# Patient Record
Sex: FEMALE | Race: BLACK OR AFRICAN AMERICAN | NOT HISPANIC OR LATINO | Employment: OTHER | URBAN - METROPOLITAN AREA
[De-identification: names, ages, dates, MRNs, and addresses within clinical notes are randomized per-mention and may not be internally consistent; named-entity substitution may affect disease eponyms.]

---

## 2019-08-03 ENCOUNTER — PREP FOR SURGERY (OUTPATIENT)
Dept: OTHER | Facility: HOSPITAL | Age: 64
End: 2019-08-03

## 2019-08-03 DIAGNOSIS — Z80.0 FH: COLON CANCER: Primary | ICD-10-CM

## 2019-10-18 ENCOUNTER — TELEPHONE (OUTPATIENT)
Dept: GASTROENTEROLOGY | Facility: CLINIC | Age: 64
End: 2019-10-18

## 2019-10-18 NOTE — TELEPHONE ENCOUNTER
----- Message from Corinna Dawson Rep sent at 10/18/2019 11:34 AM EDT -----  Regarding: scope  Contact: [phone]  Pt is calling and wanting to talk to nurse said she is suppose to be scheduled for a colonoscopy when Fer is on call.

## 2019-10-18 NOTE — TELEPHONE ENCOUNTER
Call to pt.  States would like to schedule c/s with Dr Monroe - usually schedules with him on Hospitalist day.    Advise will send request to Scheduling.  Verb understanding.

## 2019-10-21 ENCOUNTER — PREP FOR SURGERY (OUTPATIENT)
Dept: OTHER | Facility: HOSPITAL | Age: 64
End: 2019-10-21

## 2019-10-21 DIAGNOSIS — Z80.0 FH: COLON CANCER: Primary | ICD-10-CM

## 2019-10-21 NOTE — TELEPHONE ENCOUNTER
"I think that she wants her colonoscopy scheduled on a Saturday that I am \"on call\". I see that I am \"on Call\" on Sat, 11/9/19. You could schedule it then. [email or web address]  "

## 2019-10-29 PROBLEM — Z80.0 FH: COLON CANCER: Status: ACTIVE | Noted: 2019-10-29

## 2020-08-09 ENCOUNTER — PREP FOR SURGERY (OUTPATIENT)
Dept: OTHER | Facility: HOSPITAL | Age: 65
End: 2020-08-09

## 2020-08-09 DIAGNOSIS — Z12.11 SCREEN FOR COLON CANCER: Primary | ICD-10-CM

## 2020-08-31 ENCOUNTER — TELEPHONE (OUTPATIENT)
Dept: GASTROENTEROLOGY | Facility: CLINIC | Age: 65
End: 2020-08-31

## 2020-08-31 NOTE — TELEPHONE ENCOUNTER
Left message for pt to call us back to schedule cn         ----- Message from Diane Bennett sent at 8/31/2020  9:31 AM EDT -----  Contact: [phone]  Patient is needing to schedule scope with JOSE

## 2020-09-01 PROBLEM — Z12.11 SCREEN FOR COLON CANCER: Status: ACTIVE | Noted: 2020-09-01

## 2020-10-05 ENCOUNTER — TRANSCRIBE ORDERS (OUTPATIENT)
Dept: ADMINISTRATIVE | Facility: HOSPITAL | Age: 65
End: 2020-10-05

## 2020-10-05 DIAGNOSIS — Z01.818 OTHER SPECIFIED PRE-OPERATIVE EXAMINATION: Primary | ICD-10-CM

## 2020-10-13 ENCOUNTER — TELEPHONE (OUTPATIENT)
Dept: GASTROENTEROLOGY | Facility: CLINIC | Age: 65
End: 2020-10-13

## 2020-10-13 NOTE — TELEPHONE ENCOUNTER
Called pt no answer lm on vm    ----- Message from Diane Bennett sent at 10/13/2020  8:07 AM EDT -----  Contact: [phone]  Patient has a procedure scheduled with Dr. Monroe on 10/14, did not have COVID test.  Please call patient to reschedule.  Thanks!

## 2020-10-14 ENCOUNTER — TELEPHONE (OUTPATIENT)
Dept: GASTROENTEROLOGY | Facility: CLINIC | Age: 65
End: 2020-10-14

## 2020-10-14 NOTE — TELEPHONE ENCOUNTER
Called pt back to confirm plan does not require auth for cpt 13892. No info on Covid testing being covered she would need to call [employer] member services.      Pt is calling insurance to confirm cpt 47286 and covid testing is covered then she will call back to reschedule

## 2020-10-15 ENCOUNTER — TRANSCRIBE ORDERS (OUTPATIENT)
Dept: SLEEP MEDICINE | Facility: HOSPITAL | Age: 65
End: 2020-10-15

## 2020-10-15 DIAGNOSIS — Z01.818 OTHER SPECIFIED PRE-OPERATIVE EXAMINATION: Primary | ICD-10-CM

## 2020-10-17 ENCOUNTER — LAB (OUTPATIENT)
Dept: LAB | Facility: HOSPITAL | Age: 65
End: 2020-10-17

## 2020-10-17 DIAGNOSIS — Z01.818 OTHER SPECIFIED PRE-OPERATIVE EXAMINATION: ICD-10-CM

## 2020-10-17 PROCEDURE — C9803 HOPD COVID-19 SPEC COLLECT: HCPCS

## 2020-10-17 PROCEDURE — U0004 COV-19 TEST NON-CDC HGH THRU: HCPCS | Performed by: INTERNAL MEDICINE

## 2020-10-19 LAB — SARS-COV-2 RNA RESP QL NAA+PROBE: NOT DETECTED

## 2020-10-20 ENCOUNTER — ANESTHESIA (OUTPATIENT)
Dept: GASTROENTEROLOGY | Facility: HOSPITAL | Age: 65
End: 2020-10-20

## 2020-10-20 ENCOUNTER — HOSPITAL ENCOUNTER (OUTPATIENT)
Facility: HOSPITAL | Age: 65
Setting detail: HOSPITAL OUTPATIENT SURGERY
Discharge: HOME | End: 2020-10-20
Attending: INTERNAL MEDICINE | Admitting: INTERNAL MEDICINE

## 2020-10-20 ENCOUNTER — ANESTHESIA EVENT (OUTPATIENT)
Dept: GASTROENTEROLOGY | Facility: HOSPITAL | Age: 65
End: 2020-10-20

## 2020-10-20 VITALS
WEIGHT: 270 LBS | TEMPERATURE: 98.3 F | OXYGEN SATURATION: 98 % | DIASTOLIC BLOOD PRESSURE: 51 MMHG | HEIGHT: 68 IN | BODY MASS INDEX: 40.92 KG/M2 | RESPIRATION RATE: 12 BRPM | HEART RATE: 58 BPM | SYSTOLIC BLOOD PRESSURE: 114 MMHG

## 2020-10-20 LAB — GLUCOSE BLDC GLUCOMTR-MCNC: 149 MG/DL (ref 70–130)

## 2020-10-20 PROCEDURE — G0121 COLON CA SCRN NOT HI RSK IND: HCPCS | Performed by: INTERNAL MEDICINE

## 2020-10-20 PROCEDURE — 25010000002 PROPOFOL 10 MG/ML EMULSION: Performed by: NURSE ANESTHETIST, CERTIFIED REGISTERED

## 2020-10-20 PROCEDURE — 82962 GLUCOSE BLOOD TEST: CPT

## 2020-10-20 RX ORDER — FLUTICASONE PROPIONATE 50 MCG
2 SPRAY, SUSPENSION (ML) NASAL AS NEEDED
COMMUNITY

## 2020-10-20 RX ORDER — SODIUM CHLORIDE 0.9 % (FLUSH) 0.9 %
10 SYRINGE (ML) INJECTION AS NEEDED
Status: DISCONTINUED | OUTPATIENT
Start: 2020-10-20 | End: 2020-10-20 | Stop reason: HOSPADM

## 2020-10-20 RX ORDER — LOSARTAN POTASSIUM AND HYDROCHLOROTHIAZIDE 25; 100 MG/1; MG/1
1 TABLET ORAL DAILY
COMMUNITY

## 2020-10-20 RX ORDER — EPHEDRINE SULFATE 50 MG/ML
INJECTION, SOLUTION INTRAVENOUS AS NEEDED
Status: DISCONTINUED | OUTPATIENT
Start: 2020-10-20 | End: 2020-10-20 | Stop reason: SURG

## 2020-10-20 RX ORDER — PROPOFOL 10 MG/ML
VIAL (ML) INTRAVENOUS CONTINUOUS PRN
Status: DISCONTINUED | OUTPATIENT
Start: 2020-10-20 | End: 2020-10-20 | Stop reason: SURG

## 2020-10-20 RX ORDER — GLYCOPYRROLATE 0.2 MG/ML
INJECTION INTRAMUSCULAR; INTRAVENOUS AS NEEDED
Status: DISCONTINUED | OUTPATIENT
Start: 2020-10-20 | End: 2020-10-20 | Stop reason: SURG

## 2020-10-20 RX ORDER — SODIUM CHLORIDE, SODIUM LACTATE, POTASSIUM CHLORIDE, CALCIUM CHLORIDE 600; 310; 30; 20 MG/100ML; MG/100ML; MG/100ML; MG/100ML
1000 INJECTION, SOLUTION INTRAVENOUS CONTINUOUS
Status: DISCONTINUED | OUTPATIENT
Start: 2020-10-20 | End: 2020-10-20 | Stop reason: HOSPADM

## 2020-10-20 RX ORDER — PROPOFOL 10 MG/ML
VIAL (ML) INTRAVENOUS AS NEEDED
Status: DISCONTINUED | OUTPATIENT
Start: 2020-10-20 | End: 2020-10-20 | Stop reason: SURG

## 2020-10-20 RX ADMIN — GLYCOPYRROLATE 0.2 MG: 0.2 INJECTION INTRAMUSCULAR; INTRAVENOUS at 08:55

## 2020-10-20 RX ADMIN — SODIUM CHLORIDE, POTASSIUM CHLORIDE, SODIUM LACTATE AND CALCIUM CHLORIDE: 600; 310; 30; 20 INJECTION, SOLUTION INTRAVENOUS at 08:28

## 2020-10-20 RX ADMIN — PROPOFOL 180 MCG/KG/MIN: 10 INJECTION, EMULSION INTRAVENOUS at 08:31

## 2020-10-20 RX ADMIN — PROPOFOL 50 MG: 10 INJECTION, EMULSION INTRAVENOUS at 08:31

## 2020-10-20 RX ADMIN — GLYCOPYRROLATE 0.2 MG: 0.2 INJECTION INTRAMUSCULAR; INTRAVENOUS at 08:38

## 2020-10-20 RX ADMIN — EPHEDRINE SULFATE 10 MG: 50 INJECTION INTRAVENOUS at 08:58

## 2020-10-20 NOTE — ANESTHESIA POSTPROCEDURE EVALUATION
"Patient: Mague Bennett    Procedure Summary     Date: 10/20/20 Room / Location:  [facility]    Anesthesia Start: 0827 Anesthesia Stop: 0902    Procedure: COLONOSCOPY INTO CECUM AND TERMINAL ILEUM (N/A ) Diagnosis:       Screen for colon cancer      (Screen for colon cancer [Z12.11])    Surgeon: Rudy Monroe MD Provider: Edwin Crockett MD    Anesthesia Type: MAC ASA Status: 3          Anesthesia Type: MAC    Vitals  Vitals Value Taken Time   /51 10/20/20 0924   Temp     Pulse 58 10/20/20 0924   Resp     SpO2 98 % 10/20/20 0924           Post Anesthesia Care and Evaluation    Patient location during evaluation: PACU  Patient participation: complete - patient participated  Level of consciousness: awake  Pain score: 0  Pain management: adequate  Airway patency: patent  Anesthetic complications: No anesthetic complications  PONV Status: none  Cardiovascular status: acceptable  Respiratory status: acceptable  Hydration status: acceptable    Comments: /51 (BP Location: Left arm, Patient Position: Lying)   Pulse 58   Temp 36.8 °C (98.3 °F)   Resp 12   Ht 172.7 cm (68\")   Wt 122 kg (270 lb)   SpO2 98%   BMI 41.05 kg/m²       "

## 2020-10-20 NOTE — H&P
"[facility], operated by [facility]  Pre Procedure History & Physical    Chief Complaint:   Time for my colonoscopy    Subjective     HPI:   65 y.o. female here for a screening colonoscopy. Her last colonoscopy was about 10 yrs ago.     Past Medical History:   Past Medical History:   Diagnosis Date   • Diabetes mellitus (CMS/HCC)    • Hyperlipidemia    • Hypertension    • Seasonal allergies        Family History:  History reviewed. No pertinent family history.    Social History:   reports that she has never smoked. She does not have any smokeless tobacco history on file. She reports that she does not drink alcohol.    Medications:   Medications Prior to Admission   Medication Sig Dispense Refill Last Dose   • fluticasone (FLONASE) 50 MCG/ACT nasal spray 2 sprays into the nostril(s) as directed by provider As Needed for Rhinitis.   Past Week at Unknown time   • insulin lispro (humaLOG) 100 UNIT/ML injection Inject  under the skin into the appropriate area as directed 3 (Three) Times a Day Before Meals. Per insulin pump   10/19/2020 at Unknown time   • losartan-hydrochlorothiazide (HYZAAR) 100-25 MG per tablet Take 1 tablet by mouth Daily.   10/19/2020 at Unknown time       Allergies:  Morphine    ROS:    Pertinent items are noted in HPI     Objective     Blood pressure 154/73, pulse 71, temperature 98.3 °F (36.8 °C), resp. rate 12, height 172.7 cm (68\"), weight 122 kg (270 lb), SpO2 99 %.    Physical Exam   Constitutional: Pt is oriented to person, place, and time and well-developed, well-nourished, and in no distress.   HENT:   Mouth/Throat: Oropharynx is clear and moist.   Neck: Normal range of motion. Neck supple.   Cardiovascular: Normal rate, regular rhythm and normal heart sounds.    Pulmonary/Chest: Effort normal and breath sounds normal. No respiratory distress. No  wheezes.   Abdominal: Soft. Bowel sounds are normal.   Skin: Skin is warm and dry.   Psychiatric: Mood, memory, affect and judgment normal. "     Assessment/Plan     Diagnosis:  65 y.o. female here for a screening colonoscopy. Her last colonoscopy was about 10 yrs ago.     Anticipated Surgical Procedure:  Colonoscopy    The risks, benefits, and alternatives of this procedure have been discussed with the patient or the responsible party- the patient understands and agrees to proceed.    Rudy Monroe M.D.

## 2020-10-20 NOTE — ANESTHESIA PREPROCEDURE EVALUATION
Anesthesia Evaluation     Patient summary reviewed and Nursing notes reviewed                Airway   Mallampati: I  TM distance: >3 FB  Neck ROM: full  No difficulty expected  Dental - normal exam     Pulmonary - negative pulmonary ROS and normal exam   Cardiovascular - normal exam    (+) hypertension, hyperlipidemia,       Neuro/Psych- negative ROS  GI/Hepatic/Renal/Endo    (+) obesity,   diabetes mellitus,     Musculoskeletal (-) negative ROS    Abdominal  - normal exam    Bowel sounds: normal.   Substance History - negative use     OB/GYN negative ob/gyn ROS         Other                      Anesthesia Plan    ASA 3     MAC       Anesthetic plan, all risks, benefits, and alternatives have been provided, discussed and informed consent has been obtained with: patient.

## 2023-03-28 ENCOUNTER — TELEPHONE (OUTPATIENT)
Dept: GASTROENTEROLOGY | Facility: CLINIC | Age: 68
End: 2023-03-28

## 2023-03-28 NOTE — TELEPHONE ENCOUNTER
Caller: Mague Bennett    Relationship to patient: Self    Best call back number:  [phone] (Mobile)    Patient is needing: PT IS CALLING TO VERIFY IF SHE IS DUE FOR A REPEAT COLONOSCOPY. IF SHE IS SHE WOULD LIKE TO DO A COLOGUARD TEST INSTEAD. PLEASE CALL PT ASAP

## 2023-03-29 ENCOUNTER — TELEPHONE (OUTPATIENT)
Dept: GASTROENTEROLOGY | Facility: CLINIC | Age: 68
End: 2023-03-29
Payer: MEDICARE

## 2023-03-29 NOTE — TELEPHONE ENCOUNTER
Caller: Mague Bennett     Relationship to patient: Self     Best call back number:           [phone] (Mobile)     Patient is needing: PT IS CALLING TO VERIFY IF SHE IS DUE FOR A REPEAT COLONOSCOPY. IF SHE IS SHE WOULD LIKE TO DO A COLOGUARD TEST INSTEAD. PLEASE CALL PT ASAP

## 2023-03-30 DIAGNOSIS — Z12.11 ENCOUNTER FOR SCREENING FOR MALIGNANT NEOPLASM OF COLON: Primary | ICD-10-CM

## 2023-03-30 NOTE — TELEPHONE ENCOUNTER
March 30, 2023  Rudy Monroe MD  to Judy Ambrosio RN • Jacqueline Lucas RN • Me        7:29 AM  Please tell her that I was ordered a Cologuard test for her.  Hopefully the Cologuard folks will be in touch with her soon?     **Call to pt.  Advise of above.  Verb understanding.  Kirstin Griffin RN.

## 2023-04-12 ENCOUNTER — TELEPHONE (OUTPATIENT)
Dept: GASTROENTEROLOGY | Facility: CLINIC | Age: 68
End: 2023-04-12
Payer: MEDICARE

## 2023-04-12 NOTE — TELEPHONE ENCOUNTER
----- Message from Rudy Monroe MD sent at 4/10/2023  8:30 PM EDT -----  04/10/23       Tell her that the ColoGuard test could not be processed because the collection kit was damaged. Radha says that they will contact the patient to initiate a new sample collection.  Thx. kjh

## 2023-09-26 ENCOUNTER — TRANSCRIBE ORDERS (OUTPATIENT)
Dept: DIABETES SERVICES | Facility: HOSPITAL | Age: 68
End: 2023-09-26
Payer: MEDICARE

## 2023-09-26 DIAGNOSIS — N18.1 HYPERTENSIVE KIDNEY DISEASE WITH CHRONIC KIDNEY DISEASE STAGE I: ICD-10-CM

## 2023-09-26 DIAGNOSIS — E10.29 TYPE 1 DIABETES MELLITUS WITH OTHER DIABETIC KIDNEY COMPLICATION: ICD-10-CM

## 2023-09-26 DIAGNOSIS — N18.32 STAGE 3B CHRONIC KIDNEY DISEASE: Primary | ICD-10-CM

## 2023-09-26 DIAGNOSIS — I12.9 HYPERTENSIVE KIDNEY DISEASE WITH CHRONIC KIDNEY DISEASE STAGE I: ICD-10-CM

## 2023-10-03 ENCOUNTER — HOSPITAL ENCOUNTER (OUTPATIENT)
Dept: DIABETES SERVICES | Facility: HOSPITAL | Age: 68
Discharge: HOME | End: 2023-10-03
Admitting: INTERNAL MEDICINE
Payer: MEDICARE

## 2023-10-03 PROCEDURE — 97802 MEDICAL NUTRITION INDIV IN: CPT

## 2023-10-03 NOTE — CONSULTS
Mague was seen today by Registered Dietitian for Diabetes/CKD Education. Consistent with the ADA’s standards of care, a comprehensive assessment/training record has been sent to medical records (see media tab).    Has insulin pump, enters in carbs- unsure what specific ICR is. Reports a low blood sugar this morning at 85 mg/dl. Hypo/rule of 15 reviewed. Encouraged 30-45g carb meal pattern. Discussed balancing carbs with protein for stabilization. Recently got an exercise bike and plans on using once it is set up. Overview of CKD nutrients reviewed.    Mague has been encouraged to call our office with questions or additional education needs. Please place referral for additional services or follow-up should need arise.    Thank you for the referral.

## 2025-01-22 ENCOUNTER — HOSPITAL ENCOUNTER (EMERGENCY)
Facility: HOSPITAL | Age: 70
Discharge: HOME | End: 2025-01-23
Attending: EMERGENCY MEDICINE
Payer: MEDICARE

## 2025-01-22 ENCOUNTER — APPOINTMENT (OUTPATIENT)
Dept: GENERAL RADIOLOGY | Facility: HOSPITAL | Age: 70
End: 2025-01-22
Payer: MEDICARE

## 2025-01-22 DIAGNOSIS — I10 UNCONTROLLED HYPERTENSION: Primary | ICD-10-CM

## 2025-01-22 LAB
ALBUMIN SERPL-MCNC: 4 G/DL (ref 3.5–5.2)
ALBUMIN/GLOB SERPL: 1 G/DL
ALP SERPL-CCNC: 69 U/L (ref 39–117)
ALT SERPL W P-5'-P-CCNC: 70 U/L (ref 1–33)
ANION GAP SERPL CALCULATED.3IONS-SCNC: 14 MMOL/L (ref 5–15)
AST SERPL-CCNC: 66 U/L (ref 1–32)
BASOPHILS # BLD AUTO: 0.07 10*3/MM3 (ref 0–0.2)
BASOPHILS NFR BLD AUTO: 0.9 % (ref 0–1.5)
BILIRUB SERPL-MCNC: 0.3 MG/DL (ref 0–1.2)
BUN SERPL-MCNC: 31 MG/DL (ref 8–23)
BUN/CREAT SERPL: 28.7 (ref 7–25)
CALCIUM SPEC-SCNC: 9.7 MG/DL (ref 8.6–10.5)
CHLORIDE SERPL-SCNC: 104 MMOL/L (ref 98–107)
CO2 SERPL-SCNC: 22 MMOL/L (ref 22–29)
CREAT SERPL-MCNC: 1.08 MG/DL (ref 0.57–1)
DEPRECATED RDW RBC AUTO: 38 FL (ref 37–54)
EGFRCR SERPLBLD CKD-EPI 2021: 55.7 ML/MIN/1.73
EOSINOPHIL # BLD AUTO: 0.25 10*3/MM3 (ref 0–0.4)
EOSINOPHIL NFR BLD AUTO: 3.3 % (ref 0.3–6.2)
ERYTHROCYTE [DISTWIDTH] IN BLOOD BY AUTOMATED COUNT: 12.7 % (ref 12.3–15.4)
GLOBULIN UR ELPH-MCNC: 4 GM/DL
GLUCOSE SERPL-MCNC: 98 MG/DL (ref 65–99)
HCT VFR BLD AUTO: 32 % (ref 34–46.6)
HGB BLD-MCNC: 10.1 G/DL (ref 12–15.9)
IMM GRANULOCYTES # BLD AUTO: 0.03 10*3/MM3 (ref 0–0.05)
IMM GRANULOCYTES NFR BLD AUTO: 0.4 % (ref 0–0.5)
LYMPHOCYTES # BLD AUTO: 1.12 10*3/MM3 (ref 0.7–3.1)
LYMPHOCYTES NFR BLD AUTO: 14.9 % (ref 19.6–45.3)
MAGNESIUM SERPL-MCNC: 2 MG/DL (ref 1.6–2.4)
MCH RBC QN AUTO: 26.2 PG (ref 26.6–33)
MCHC RBC AUTO-ENTMCNC: 31.6 G/DL (ref 31.5–35.7)
MCV RBC AUTO: 82.9 FL (ref 79–97)
MONOCYTES # BLD AUTO: 0.54 10*3/MM3 (ref 0.1–0.9)
MONOCYTES NFR BLD AUTO: 7.2 % (ref 5–12)
NEUTROPHILS NFR BLD AUTO: 5.51 10*3/MM3 (ref 1.7–7)
NEUTROPHILS NFR BLD AUTO: 73.3 % (ref 42.7–76)
NRBC BLD AUTO-RTO: 0 /100 WBC (ref 0–0.2)
PLATELET # BLD AUTO: 449 10*3/MM3 (ref 140–450)
PMV BLD AUTO: 10.9 FL (ref 6–12)
POTASSIUM SERPL-SCNC: 3.8 MMOL/L (ref 3.5–5.2)
PROT SERPL-MCNC: 8 G/DL (ref 6–8.5)
RBC # BLD AUTO: 3.86 10*6/MM3 (ref 3.77–5.28)
SODIUM SERPL-SCNC: 140 MMOL/L (ref 136–145)
TROPONIN T SERPL HS-MCNC: 41 NG/L
WBC NRBC COR # BLD AUTO: 7.52 10*3/MM3 (ref 3.4–10.8)

## 2025-01-22 PROCEDURE — 83735 ASSAY OF MAGNESIUM: CPT | Performed by: EMERGENCY MEDICINE

## 2025-01-22 PROCEDURE — 93005 ELECTROCARDIOGRAM TRACING: CPT | Performed by: EMERGENCY MEDICINE

## 2025-01-22 PROCEDURE — 84484 ASSAY OF TROPONIN QUANT: CPT | Performed by: EMERGENCY MEDICINE

## 2025-01-22 PROCEDURE — 36415 COLL VENOUS BLD VENIPUNCTURE: CPT

## 2025-01-22 PROCEDURE — 93010 ELECTROCARDIOGRAM REPORT: CPT | Performed by: INTERNAL MEDICINE

## 2025-01-22 PROCEDURE — 82948 REAGENT STRIP/BLOOD GLUCOSE: CPT

## 2025-01-22 PROCEDURE — 71045 X-RAY EXAM CHEST 1 VIEW: CPT

## 2025-01-22 PROCEDURE — 80053 COMPREHEN METABOLIC PANEL: CPT | Performed by: EMERGENCY MEDICINE

## 2025-01-22 PROCEDURE — 85025 COMPLETE CBC W/AUTO DIFF WBC: CPT | Performed by: EMERGENCY MEDICINE

## 2025-01-22 PROCEDURE — 99284 EMERGENCY DEPT VISIT MOD MDM: CPT

## 2025-01-22 RX ORDER — AMLODIPINE BESYLATE 10 MG/1
1 TABLET ORAL DAILY
COMMUNITY
Start: 2025-01-08

## 2025-01-22 RX ORDER — CLONIDINE HYDROCHLORIDE 0.1 MG/1
0.1 TABLET ORAL ONCE
Status: DISCONTINUED | OUTPATIENT
Start: 2025-01-22 | End: 2025-01-22

## 2025-01-22 RX ORDER — ASPIRIN 81 MG/1
324 TABLET, CHEWABLE ORAL ONCE
Status: COMPLETED | OUTPATIENT
Start: 2025-01-22 | End: 2025-01-22

## 2025-01-22 RX ORDER — COLCHICINE 0.6 MG/1
0.6 TABLET ORAL 2 TIMES DAILY
COMMUNITY

## 2025-01-22 RX ADMIN — ASPIRIN 324 MG: 81 TABLET, CHEWABLE ORAL at 23:11

## 2025-01-22 NOTE — Clinical Note
[facility] EMERGENCY DEPARTMENT  [address] [facility] [address]  Phone: [phone]    Mague Bennett was seen and treated in our emergency department on 1/22/2025.  She may return to work on 01/27/2025.         Thank you for choosing [facility].    Fanny Uribe MD

## 2025-01-23 VITALS
SYSTOLIC BLOOD PRESSURE: 165 MMHG | HEART RATE: 53 BPM | BODY MASS INDEX: 42.21 KG/M2 | DIASTOLIC BLOOD PRESSURE: 67 MMHG | TEMPERATURE: 96.8 F | RESPIRATION RATE: 18 BRPM | WEIGHT: 268.96 LBS | HEIGHT: 67 IN | OXYGEN SATURATION: 96 %

## 2025-01-23 LAB
GEN 5 1HR TROPONIN T REFLEX: 40 NG/L
GLUCOSE BLDC GLUCOMTR-MCNC: 92 MG/DL (ref 70–130)
QT INTERVAL: 491 MS
QTC INTERVAL: 448 MS
TROPONIN T % DELTA: -2
TROPONIN T NUMERIC DELTA: -1 NG/L

## 2025-01-23 PROCEDURE — 84484 ASSAY OF TROPONIN QUANT: CPT | Performed by: EMERGENCY MEDICINE

## 2025-01-23 RX ORDER — HYDROCHLOROTHIAZIDE 12.5 MG/1
12.5 TABLET ORAL ONCE
Status: COMPLETED | OUTPATIENT
Start: 2025-01-23 | End: 2025-01-23

## 2025-01-23 RX ORDER — HYDROCHLOROTHIAZIDE 12.5 MG/1
12.5 TABLET ORAL DAILY
Qty: 30 TABLET | Refills: 0 | Status: SHIPPED | OUTPATIENT
Start: 2025-01-23 | End: 2025-02-22

## 2025-01-23 RX ADMIN — HYDROCHLOROTHIAZIDE 12.5 MG: 12.5 TABLET ORAL at 01:30

## 2025-01-23 NOTE — ED PROVIDER NOTES
EMERGENCY DEPARTMENT ENCOUNTER    History  Chief Complaint   Patient presents with    Hypertension       History provided by: Patient and Relative     HPI:  Chief Complaint: High blood pressure    Context: Pt is a 69 y.o. female who presents with complaints of high blood pressure.  She notes that she has been dealing with elevated blood pressure over the past several weeks.  She reports this is her third visit to the emergency department.  She is currently maintained on amlodipine 10 mg, which was recently increased from 5 mg a few weeks ago.  She also takes losartan/HCTZ 100-12.5.  She was seen by her nephrologist today, and asked to monitor her blood pressure at home.  She says she has been having some discomfort in her left shoulder.  It has been going on for quite a while and she cannot pinpoint an exact time course.  Seems to be worse with movement of the arm.  Not having any other associated chest pain, shortness of breath, leg swelling, severe headache or other symptoms.      Active Ambulatory Problems     Diagnosis Date Noted    FH: colon cancer 10/29/2019    Screen for colon cancer 09/01/2020     Resolved Ambulatory Problems     Diagnosis Date Noted    No Resolved Ambulatory Problems     Past Medical History:   Diagnosis Date    Diabetes mellitus     Hyperlipidemia     Hypertension     Seasonal allergies        Past Surgical History:   Procedure Laterality Date    COLONOSCOPY N/A 10/20/2020    Procedure: COLONOSCOPY INTO CECUM AND TERMINAL ILEUM;  Surgeon: Rudy Monroe MD;  Location: [facility];  Service: Gastroenterology;  Laterality: N/A;  PRE: SCREENING  POST: FAIR TO POOR PREP; GROSSLY NORMAL    HYSTERECTOMY      TOTAL HIP ARTHROPLASTY Right     approx 2016 at [facility]       Physical Exam  ED Triage Vitals   Temp Heart Rate Resp BP SpO2   01/22/25 2011 01/22/25 2011 01/22/25 2011 01/22/25 2022 01/22/25 2011   96.8 °F (36 °C) 61 18 (!) 188/77 97 %      Temp src Heart Rate Source Patient  Position BP Location FiO2 (%)   01/22/25 2011 01/22/25 2011 -- -- --   Tympanic Monitor        Physical Exam  Constitutional:       Appearance: Normal appearance.   HENT:      Head: Normocephalic and atraumatic.   Eyes:      Pupils: Pupils are equal, round, and reactive to light.   Cardiovascular:      Rate and Rhythm: Normal rate and regular rhythm.      Heart sounds: No murmur heard.  Abdominal:      Tenderness: There is no abdominal tenderness. There is no guarding or rebound.   Musculoskeletal:      Right shoulder: Tenderness and bony tenderness (Humeral head and AC joint) present. No swelling or deformity. Normal strength. Normal pulse.      Comments: Left upper extremity:    Skin:     General: Skin is warm.   Neurological:      Mental Status: She is alert and oriented to person, place, and time.         Medical Decision Making:    Differential Diagnosis includes but not limited to: Uncontrolled hypertension, acute kidney injury, rotator cuff pathology, atypical ACS    Alternative Historian Required Due To: Contribute additional details of history    Medical Record Review: Reviewed today's nephrology office visit note    Labs Results:  Recent Results (from the past 24 hours)   ECG 12 Lead Other; uncontrolled HTN    Collection Time: 01/22/25 10:44 PM   Result Value Ref Range    QT Interval 491 ms    QTC Interval 448 ms   Comprehensive Metabolic Panel    Collection Time: 01/22/25 11:12 PM    Specimen: Blood   Result Value Ref Range    Glucose 98 65 - 99 mg/dL    BUN 31 (H) 8 - 23 mg/dL    Creatinine 1.08 (H) 0.57 - 1.00 mg/dL    Sodium 140 136 - 145 mmol/L    Potassium 3.8 3.5 - 5.2 mmol/L    Chloride 104 98 - 107 mmol/L    CO2 22.0 22.0 - 29.0 mmol/L    Calcium 9.7 8.6 - 10.5 mg/dL    Total Protein 8.0 6.0 - 8.5 g/dL    Albumin 4.0 3.5 - 5.2 g/dL    ALT (SGPT) 70 (H) 1 - 33 U/L    AST (SGOT) 66 (H) 1 - 32 U/L    Alkaline Phosphatase 69 39 - 117 U/L    Total Bilirubin 0.3 0.0 - 1.2 mg/dL    Globulin 4.0 gm/dL     A/G Ratio 1.0 g/dL    BUN/Creatinine Ratio 28.7 (H) 7.0 - 25.0    Anion Gap 14.0 5.0 - 15.0 mmol/L    eGFR 55.7 (L) >60.0 mL/min/1.73   High Sensitivity Troponin T    Collection Time: 01/22/25 11:12 PM    Specimen: Blood   Result Value Ref Range    HS Troponin T 41 (H) <14 ng/L   CBC Auto Differential    Collection Time: 01/22/25 11:12 PM    Specimen: Blood   Result Value Ref Range    WBC 7.52 3.40 - 10.80 10*3/mm3    RBC 3.86 3.77 - 5.28 10*6/mm3    Hemoglobin 10.1 (L) 12.0 - 15.9 g/dL    Hematocrit 32.0 (L) 34.0 - 46.6 %    MCV 82.9 79.0 - 97.0 fL    MCH 26.2 (L) 26.6 - 33.0 pg    MCHC 31.6 31.5 - 35.7 g/dL    RDW 12.7 12.3 - 15.4 %    RDW-SD 38.0 37.0 - 54.0 fl    MPV 10.9 6.0 - 12.0 fL    Platelets 449 140 - 450 10*3/mm3    Neutrophil % 73.3 42.7 - 76.0 %    Lymphocyte % 14.9 (L) 19.6 - 45.3 %    Monocyte % 7.2 5.0 - 12.0 %    Eosinophil % 3.3 0.3 - 6.2 %    Basophil % 0.9 0.0 - 1.5 %    Immature Grans % 0.4 0.0 - 0.5 %    Neutrophils, Absolute 5.51 1.70 - 7.00 10*3/mm3    Lymphocytes, Absolute 1.12 0.70 - 3.10 10*3/mm3    Monocytes, Absolute 0.54 0.10 - 0.90 10*3/mm3    Eosinophils, Absolute 0.25 0.00 - 0.40 10*3/mm3    Basophils, Absolute 0.07 0.00 - 0.20 10*3/mm3    Immature Grans, Absolute 0.03 0.00 - 0.05 10*3/mm3    nRBC 0.0 0.0 - 0.2 /100 WBC   Magnesium    Collection Time: 01/22/25 11:12 PM    Specimen: Blood   Result Value Ref Range    Magnesium 2.0 1.6 - 2.4 mg/dL   POC Glucose Once    Collection Time: 01/22/25 11:59 PM    Specimen: Blood   Result Value Ref Range    Glucose 92 70 - 130 mg/dL   High Sensitivity Troponin T 1Hr    Collection Time: 01/23/25 12:49 AM    Specimen: Blood   Result Value Ref Range    HS Troponin T 40 (H) <14 ng/L    Troponin T Numeric Delta -1 ng/L    Troponin T % Delta -2 Abnormal if >/= 20%     Lab Comments:  My independent interpretation of the above labs: Stably mildly elevated troponin levels      Radiology:  XR Chest 1 View    Result Date: 1/22/2025  SINGLE VIEW OF THE  CHEST  HISTORY: Left-sided chest pain  COMPARISON: None available.  FINDINGS: There is cardiomegaly. There is no vascular congestion. No pneumothorax, pleural effusion, or acute infiltrate is seen.      No acute findings.  This report was finalized on 1/22/2025 11:20 PM by Dr. Guillermina Shields M.D on Workstation: [id number]     Radiology Comments:  I ordered the above imaging and reviewed the results.    My independent interpretation of the cxr: no acute process    EKG Interpreted by me: Sinus bradycardia, nonspecific T wave flattening, suspected LVH      Medications given in ED:  Medications   aspirin chewable tablet 324 mg (324 mg Oral Given 1/22/25 2311)   hydroCHLOROthiazide tablet 12.5 mg (12.5 mg Oral Given 1/23/25 0130)       Rationale:  This is an overall well-appearing 69-year-old patient who is presenting today secondary to concerns for regarding her blood pressure.  She overall looks well.  She presents afebrile with elevated blood pressure, but otherwise benign vital signs.  Her clinical exam is overall normal.    Was complaining of some left upper chest pain.  On exam, this does be more related to the shoulder and is worsened with movement of the left arm.  She was evaluated with an EKG, which did not demonstrate any acute ischemic change.  Her troponins are minimally elevated, but flatly so.  I do not suspect that this is representative of endorgan damage secondary to high blood pressure given no dynamic change and no evidence of ischemic symptoms today.    She was further evaluated with labs, which demonstrate stable kidney function.    Chest x-ray does not demonstrate any evidence of pulmonary edema and she is not clinically volume overloaded.    Patient has asymptomatic hypertension in the emergency room.  Guidelines/studies show it does not need to be rapidly lowered in the emergency room.   Patient has absolutely no symptoms related to the blood pressure.    -No severe headaches dizziness,  lightheadedness, vision changes,  -No numbness, tingling, weakness, confusion, or difficulty speaking.  - No chest pain , palpitations, shortness of breath, abdominal pain.  -Normal neurologic exam.   Normal cardiovascular exam.  Lungs clear     Followup has been provided.  Patient instructed on how important followup is for blood pressure control.  Patient instructed on all return signs and symptoms.    Discussed with her that we can tweak her medication regimen a little bit.  She is currently maintained on amlodipine 10 mg and losartan/HCTZ 100-12.5.  Will add additional 12.5 mg of HCTZ to bring this total to 25 mg.  I will prescribe her an additional 12.5 mg pill and when she is due for a refill, she can go back onto the combo pill with losartan/HCTZ 100-25.    Progress Notes:  1:40 AM EST: I spoke with the patient about her ED work up, diagnosis and the plan for discharge. I discussed the importance of following up with her PCP. I instructed her to return to the Emergency Room for new or worsening symptoms. All of the pt's questions were answered. The patient is stable at time of discharge.        Diagnosis:  Final diagnoses:   Uncontrolled hypertension       Follow Up:  Mir Perez, PA  [address]  [address]  [facility] [address]  [phone]    Call in 2 days  For reevaluation, Blood pressure recheck    [facility] EMERGENCY DEPARTMENT  [address] [facility] [address] [phone]    As needed, If symptoms worsen      Rx:  HCTZ              EMR Dragon/Transcription disclaimer:  Some of this encounter note is an electronic transcription/translation of spoken language to printed text. The electronic translation of spoken language may permit erroneous, or at times, nonsensical words or phrases to be inadvertently transcribed; Although I have reviewed the note for such errors, some may still exist.        Provider Note Signed by:     Fanny Uribe MD  01/23/25  0946

## 2025-01-23 NOTE — DISCHARGE INSTRUCTIONS
Continue your amlodipine and losartan.  We will add an additional 12.5 mg of hydrochlorothiazide.  Please discuss that with your primary care provider as they can prescribe this combination pill the next time you need a refill.     Your blood pressure was elevated today.  You must followup with a primary care physician for this.  Return to the emergency room immediately for any symptoms related to a blood pressure.  Return for headaches, dizziness, lightheadedness, vision changes, chest pain, shortness of breath, abdominal pain, weakness, numbness, tingling, weakness, confusion, difficulty speaking, or any new symptoms.  Followup regarding blood pressure is very important.

## 2025-01-23 NOTE — ED TRIAGE NOTES
Pt to ED via wheelchair from home c/o HTN. Pt states she has been seen in the ED multiple times for HTN and has been dc'd. Pt wishing to be admitted. Pt says she takes Amlodipine and Losartan every morning and has been compliant.    Pt says she has been having some headaches with some nausea, hot flashes, no vision changes.

## 2025-08-25 ENCOUNTER — HOSPITAL ENCOUNTER (EMERGENCY)
Facility: HOSPITAL | Age: 70
Discharge: HOME | End: 2025-08-25
Attending: EMERGENCY MEDICINE
Payer: MEDICARE

## 2025-08-25 ENCOUNTER — APPOINTMENT (OUTPATIENT)
Dept: GENERAL RADIOLOGY | Facility: HOSPITAL | Age: 70
End: 2025-08-25
Payer: MEDICARE

## 2025-08-25 VITALS
OXYGEN SATURATION: 99 % | HEART RATE: 105 BPM | TEMPERATURE: 97.7 F | DIASTOLIC BLOOD PRESSURE: 70 MMHG | SYSTOLIC BLOOD PRESSURE: 192 MMHG | RESPIRATION RATE: 18 BRPM

## 2025-08-25 DIAGNOSIS — I10 UNCONTROLLED HYPERTENSION: Primary | ICD-10-CM

## 2025-08-25 DIAGNOSIS — R55 NEAR SYNCOPE: ICD-10-CM

## 2025-08-25 LAB
ALBUMIN SERPL-MCNC: 4.1 G/DL (ref 3.5–5.2)
ALBUMIN/GLOB SERPL: 1.3 G/DL
ALP SERPL-CCNC: 75 U/L (ref 39–117)
ALT SERPL W P-5'-P-CCNC: 67 U/L (ref 1–33)
ANION GAP SERPL CALCULATED.3IONS-SCNC: 7 MMOL/L (ref 5–15)
AST SERPL-CCNC: 95 U/L (ref 1–32)
BASOPHILS # BLD AUTO: 0.04 10*3/MM3 (ref 0–0.2)
BASOPHILS NFR BLD AUTO: 0.8 % (ref 0–1.5)
BILIRUB SERPL-MCNC: 0.3 MG/DL (ref 0–1.2)
BUN SERPL-MCNC: 36 MG/DL (ref 8–23)
BUN/CREAT SERPL: 28.1 (ref 7–25)
CALCIUM SPEC-SCNC: 10.2 MG/DL (ref 8.6–10.5)
CHLORIDE SERPL-SCNC: 103 MMOL/L (ref 98–107)
CO2 SERPL-SCNC: 24 MMOL/L (ref 22–29)
CREAT SERPL-MCNC: 1.28 MG/DL (ref 0.57–1)
DEPRECATED RDW RBC AUTO: 41.2 FL (ref 37–54)
EGFRCR SERPLBLD CKD-EPI 2021: 45.2 ML/MIN/1.73
EOSINOPHIL # BLD AUTO: 0.13 10*3/MM3 (ref 0–0.4)
EOSINOPHIL NFR BLD AUTO: 2.5 % (ref 0.3–6.2)
ERYTHROCYTE [DISTWIDTH] IN BLOOD BY AUTOMATED COUNT: 13.2 % (ref 12.3–15.4)
GEN 5 1HR TROPONIN T REFLEX: 56 NG/L
GLOBULIN UR ELPH-MCNC: 3.1 GM/DL
GLUCOSE SERPL-MCNC: 101 MG/DL (ref 65–99)
HCT VFR BLD AUTO: 31.6 % (ref 34–46.6)
HGB BLD-MCNC: 9.8 G/DL (ref 12–15.9)
IMM GRANULOCYTES # BLD AUTO: 0.02 10*3/MM3 (ref 0–0.05)
IMM GRANULOCYTES NFR BLD AUTO: 0.4 % (ref 0–0.5)
LYMPHOCYTES # BLD AUTO: 0.93 10*3/MM3 (ref 0.7–3.1)
LYMPHOCYTES NFR BLD AUTO: 18.2 % (ref 19.6–45.3)
MCH RBC QN AUTO: 26.6 PG (ref 26.6–33)
MCHC RBC AUTO-ENTMCNC: 31 G/DL (ref 31.5–35.7)
MCV RBC AUTO: 85.9 FL (ref 79–97)
MONOCYTES # BLD AUTO: 0.48 10*3/MM3 (ref 0.1–0.9)
MONOCYTES NFR BLD AUTO: 9.4 % (ref 5–12)
NEUTROPHILS NFR BLD AUTO: 3.5 10*3/MM3 (ref 1.7–7)
NEUTROPHILS NFR BLD AUTO: 68.7 % (ref 42.7–76)
NRBC BLD AUTO-RTO: 0 /100 WBC (ref 0–0.2)
NT-PROBNP SERPL-MCNC: 309 PG/ML (ref 0–900)
PLATELET # BLD AUTO: 320 10*3/MM3 (ref 140–450)
PMV BLD AUTO: 10.9 FL (ref 6–12)
POTASSIUM SERPL-SCNC: 4.1 MMOL/L (ref 3.5–5.2)
PROT SERPL-MCNC: 7.2 G/DL (ref 6–8.5)
RBC # BLD AUTO: 3.68 10*6/MM3 (ref 3.77–5.28)
SODIUM SERPL-SCNC: 134 MMOL/L (ref 136–145)
TROPONIN T % DELTA: 0
TROPONIN T NUMERIC DELTA: 0 NG/L
TROPONIN T SERPL HS-MCNC: 56 NG/L
WBC NRBC COR # BLD AUTO: 5.1 10*3/MM3 (ref 3.4–10.8)

## 2025-08-25 PROCEDURE — 36415 COLL VENOUS BLD VENIPUNCTURE: CPT | Performed by: EMERGENCY MEDICINE

## 2025-08-25 PROCEDURE — 83880 ASSAY OF NATRIURETIC PEPTIDE: CPT | Performed by: EMERGENCY MEDICINE

## 2025-08-25 PROCEDURE — 93005 ELECTROCARDIOGRAM TRACING: CPT | Performed by: EMERGENCY MEDICINE

## 2025-08-25 PROCEDURE — 71045 X-RAY EXAM CHEST 1 VIEW: CPT

## 2025-08-25 PROCEDURE — 85025 COMPLETE CBC W/AUTO DIFF WBC: CPT | Performed by: EMERGENCY MEDICINE

## 2025-08-25 PROCEDURE — 63710000001 ONDANSETRON ODT 4 MG TABLET DISPERSIBLE: Performed by: EMERGENCY MEDICINE

## 2025-08-25 PROCEDURE — 80053 COMPREHEN METABOLIC PANEL: CPT | Performed by: EMERGENCY MEDICINE

## 2025-08-25 PROCEDURE — 84484 ASSAY OF TROPONIN QUANT: CPT | Performed by: EMERGENCY MEDICINE

## 2025-08-25 RX ORDER — ONDANSETRON 4 MG/1
4 TABLET, ORALLY DISINTEGRATING ORAL ONCE
Status: COMPLETED | OUTPATIENT
Start: 2025-08-25 | End: 2025-08-25

## 2025-08-25 RX ORDER — ONDANSETRON 4 MG/1
4 TABLET, ORALLY DISINTEGRATING ORAL EVERY 8 HOURS PRN
Qty: 9 TABLET | Refills: 0 | Status: SHIPPED | OUTPATIENT
Start: 2025-08-25 | End: 2025-08-25

## 2025-08-25 RX ORDER — ONDANSETRON 4 MG/1
4 TABLET, ORALLY DISINTEGRATING ORAL EVERY 8 HOURS PRN
Qty: 9 TABLET | Refills: 0 | Status: SHIPPED | OUTPATIENT
Start: 2025-08-25

## 2025-08-25 RX ADMIN — ONDANSETRON 4 MG: 4 TABLET, ORALLY DISINTEGRATING ORAL at 23:18

## 2025-08-26 LAB
QT INTERVAL: 426 MS
QTC INTERVAL: 454 MS

## (undated) DEVICE — TUBING, SUCTION, 1/4" X 10', STRAIGHT: Brand: MEDLINE

## (undated) DEVICE — SENSR O2 OXIMAX FNGR A/ 18IN NONSTR

## (undated) DEVICE — CANN O2 ETCO2 FITS ALL CONN CO2 SMPL A/ 7IN DISP LF

## (undated) DEVICE — THE TORRENT IRRIGATION SCOPE CONNECTOR IS USED WITH THE TORRENT IRRIGATION TUBING TO PROVIDE IRRIGATION FLUIDS SUCH AS STERILE WATER DURING GASTROINTESTINAL ENDOSCOPIC PROCEDURES WHEN USED IN CONJUNCTION WITH AN IRRIGATION PUMP (OR ELECTROSURGICAL UNIT).: Brand: TORRENT

## (undated) DEVICE — LN SMPL CO2 SHTRM SD STREAM W/M LUER

## (undated) DEVICE — ADAPT CLN BIOGUARD AIR/H2O DISP

## (undated) DEVICE — KT ORCA ORCAPOD DISP STRL